# Patient Record
(demographics unavailable — no encounter records)

---

## 2025-03-27 NOTE — ASSESSMENT
[FreeTextEntry1] : #Dermatitis, eczematous  New diagnosis, uncertain prognosis, flaring  Discussed nature, chronicity and unpredictable course PLAN  -- betamethasone ointment 0.05% BID x 2 weeks. Do not use for more than 2 weeks at a time, with 1 week off in between. Side effects discussed with pt including but not limited to thinning of the skin, atrophy and dyspigmentation.   RTC PRN

## 2025-03-27 NOTE — HISTORY OF PRESENT ILLNESS
[FreeTextEntry1] : npv; rash [de-identified] : NANCY MEDINA is a pleasant 47 year F who presents for the following:  #rash, right lower leg, x multiple months, not responsive to treatments tried at home with moisturizer. ALso tried a couple days of antifungal with minimal improvement

## 2025-03-27 NOTE — HISTORY OF PRESENT ILLNESS
[FreeTextEntry1] : npv; rash [de-identified] : NANCY MEDINA is a pleasant 47 year F who presents for the following:  #rash, right lower leg, x multiple months, not responsive to treatments tried at home with moisturizer. ALso tried a couple days of antifungal with minimal improvement